# Patient Record
(demographics unavailable — no encounter records)

---

## 2025-03-12 NOTE — HEALTH RISK ASSESSMENT
[Good] : ~his/her~  mood as  good [FreeTextEntry1] : None [Yes] : Yes [Monthly or less (1 pt)] : Monthly or less (1 point) [PHQ-2 Negative - No further assessment needed] : PHQ-2 Negative - No further assessment needed [I have developed a follow-up plan documented below in the note.] : I have developed a follow-up plan documented below in the note. [Time Spent: ___ Minutes] : I spent [unfilled] minutes performing a depression screening for this patient. 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 [Audit-CScore] : 1 [de-identified] : Exercises regularly [de-identified] : Healthy diet [Never] : Never [HIV test declined] : HIV test declined [Hepatitis C test declined] : Hepatitis C test declined [Change in mental status noted] : No change in mental status noted [With Family] : lives with family [Employed] : employed [] :  [# Of Children ___] : has [unfilled] children [Sexually Active] : sexually active [Feels Safe at Home] : Feels safe at home [Fully functional (bathing, dressing, toileting, transferring, walking, feeding)] : Fully functional (bathing, dressing, toileting, transferring, walking, feeding) [Fully functional (using the telephone, shopping, preparing meals, housekeeping, doing laundry, using] : Fully functional and needs no help or supervision to perform IADLs (using the telephone, shopping, preparing meals, housekeeping, doing laundry, using transportation, managing medications and managing finances) [MammogramComments] : Not done [PapSmearComments] : Was done many years ago [FreeTextEntry2] : Works at [employer] desk

## 2025-03-12 NOTE — ASSESSMENT
[FreeTextEntry1] :  Routine measurements including height, weight, BMI, and blood pressure performed. Medications reviewed and reconciled. Tobacco, alcohol, and drug screening performed. Annual depression screening performed. Diet and exercise discussed. Blood work done today Will review over the phone RTC in 1 year for CPE or as needed

## 2025-03-12 NOTE — HISTORY OF PRESENT ILLNESS
[FreeTextEntry1] : cpe [de-identified] : Patient presented today for complete physical assessment.  Does not have any active complaints.  She has been taking Mounjaro from a weight loss specialist for last 2 years.  She has lost about 70 pounds so far.  Denies any side effects.  She stated that now she takes only 10 units every other month for maintenance purposes. Father passed away with brain cancer and mother passed away with liver cancer 2 children Mammogram not done Overdue for Pap smear

## 2025-04-02 NOTE — HEALTH RISK ASSESSMENT
[Good] : ~his/her~  mood as  good [Yes] : Yes [2 - 3 times a week (3 pts)] : 2 - 3  times a week (3 points) [1 or 2 (0 pts)] : 1 or 2 (0 points) [Never (0 pts)] : Never (0 points) [No] : In the past 12 months have you used drugs other than those required for medical reasons? No [No falls in past year] : Patient reported no falls in the past year [0] : 2) Feeling down, depressed, or hopeless: Not at all (0) [PHQ-2 Negative - No further assessment needed] : PHQ-2 Negative - No further assessment needed [No Retinopathy] : No retinopathy [With Family] : lives with family [Employed] : employed [] :  [Sexually Active] : sexually active [Feels Safe at Home] : Feels safe at home [Fully functional (bathing, dressing, toileting, transferring, walking, feeding)] : Fully functional (bathing, dressing, toileting, transferring, walking, feeding) [Fully functional (using the telephone, shopping, preparing meals, housekeeping, doing laundry, using] : Fully functional and needs no help or supervision to perform IADLs (using the telephone, shopping, preparing meals, housekeeping, doing laundry, using transportation, managing medications and managing finances) [Reports normal functional visual acuity (ie: able to read med bottle)] : Reports normal functional visual acuity [Smoke Detector] : smoke detector [Carbon Monoxide Detector] : carbon monoxide detector [Safety elements used in home] : safety elements used in home [Seat Belt] :  uses seat belt [Sunscreen] : uses sunscreen [Former] : Former [0-4] : 0-4 [> 15 Years] : > 15 Years [NO] : No [Patient reported mammogram was normal] : Patient reported mammogram was normal [Audit-CScore] : 3 [de-identified] : goes to the gym 3-4x.week (cardio and weights) [de-identified] : a lot of chicken, veggies, eggs/egg whites, likes cheese. More lean protein [[id number]] : 0 [EyeExamDate] : 02/2025 [High Risk Behavior] : no high risk behavior [Reports changes in hearing] : Reports no changes in hearing [Reports changes in vision] : Reports no changes in vision [Reports changes in dental health] : Reports no changes in dental health [Travel to Developing Areas] : does not  travel to developing areas [TB Exposure] : is not being exposed to tuberculosis [Caregiver Concerns] : does not have caregiver concerns [MammogramDate] : 03/25 [PapSmearDate] : many years ago [BoneDensityDate] : N/A [ColonoscopyDate] : N/A [FreeTextEntry2] :  at [employer] [facility] [employer] and [place] [de-identified] : 1 PPD x 4 years = 4 pack years, quit 19 years ago

## 2025-04-02 NOTE — HISTORY OF PRESENT ILLNESS
[FreeTextEntry1] : NPA/ Establish care [de-identified] : Patient is a 40-year-old female with PMH obesity, childhood asthma presenting for NPA. Patient recently had a CPE done at another PCP's office. While there, she was advised to have screening US thyroid and US abdomen done as she has been on Mounjaro 10mg prescribed through weight loss provider. Patient denies abdominal pain, N/V. US abdomen showed cholelithiasis with mild wall thickening, no duct dilatation. Further workup by MRCP or hepatobiliary scintigraphy was recommended if felt indicated. US thyroid showed TR4 nodule 1.1 x 0.5 x 0.9 cm, recommended repeat US thyroid in 1 year for surveillance. Patient reports feeling very well today. She has been taking Mounjaro once every 3 weeks for maintenance. She has been eating a very clean diet and exercises regularly.  Last mammo- 03/2025 Last pap- a long time ago Last eye exam- 2/2025 Last skin cancer screening- 2023 (no cancerous lesions)

## 2025-04-02 NOTE — PHYSICAL EXAM
[No Acute Distress] : no acute distress [Well Nourished] : well nourished [Well Developed] : well developed [Well-Appearing] : well-appearing [Normal Sclera/Conjunctiva] : normal sclera/conjunctiva [PERRL] : pupils equal round and reactive to light [EOMI] : extraocular movements intact [Normal Outer Ear/Nose] : the outer ears and nose were normal in appearance [Normal Oropharynx] : the oropharynx was normal [No JVD] : no jugular venous distention [No Lymphadenopathy] : no lymphadenopathy [Supple] : supple [Thyroid Normal, No Nodules] : the thyroid was normal and there were no nodules present [No Respiratory Distress] : no respiratory distress  [No Accessory Muscle Use] : no accessory muscle use [Clear to Auscultation] : lungs were clear to auscultation bilaterally [Normal Rate] : normal rate  [Regular Rhythm] : with a regular rhythm [Normal S1, S2] : normal S1 and S2 [No Murmur] : no murmur heard [No Carotid Bruits] : no carotid bruits [No Abdominal Bruit] : a ~M bruit was not heard ~T in the abdomen [No Varicosities] : no varicosities [Pedal Pulses Present] : the pedal pulses are present [No Edema] : there was no peripheral edema [No Palpable Aorta] : no palpable aorta [No Extremity Clubbing/Cyanosis] : no extremity clubbing/cyanosis [Soft] : abdomen soft [Non Tender] : non-tender [Non-distended] : non-distended [No Masses] : no abdominal mass palpated [No HSM] : no HSM [Normal Bowel Sounds] : normal bowel sounds [Normal Posterior Cervical Nodes] : no posterior cervical lymphadenopathy [Normal Anterior Cervical Nodes] : no anterior cervical lymphadenopathy [No CVA Tenderness] : no CVA  tenderness [No Spinal Tenderness] : no spinal tenderness [No Joint Swelling] : no joint swelling [Grossly Normal Strength/Tone] : grossly normal strength/tone [No Rash] : no rash [Coordination Grossly Intact] : coordination grossly intact [No Focal Deficits] : no focal deficits [Normal Gait] : normal gait [Deep Tendon Reflexes (DTR)] : deep tendon reflexes were 2+ and symmetric [Normal Affect] : the affect was normal [Normal Insight/Judgement] : insight and judgment were intact [de-identified] : Negative Britton's sign

## 2025-04-02 NOTE — PLAN
[FreeTextEntry1] : Weight management: - Chronic, stable - BMI WNL after significant weight loss from Mounjaro - Given normal BMI and known cholelithiasis, would recommend stopping Mounjaro due to risk for cholecystitis. Risks of the medication now outweigh benefits. Patient verbalized understanding - Recommend continuing low-fat diet and routine physical activity to maintain weight within the normal range  Thyroid nodule: - Chronic, stable - TR4 1.1 x 0.5 x 0.9 cm - Repeat US thyroid in 1 year  Cholelithiasis: - Chronic, stable - Recommend stopping Mounjaro as above - Continue low fat diet - Advised patient to notify office if she experiences abdominal pain, N/V  HCM: - Labs/urine already done earlier this month - Next mammo- 03/26 - Patient has upcoming appt next month for pap smear - Next eye exam- 02/20256 - Recommend patient make appt with dermatologist for skin cancer screening